# Patient Record
Sex: MALE | Race: WHITE | Employment: PART TIME | URBAN - METROPOLITAN AREA
[De-identification: names, ages, dates, MRNs, and addresses within clinical notes are randomized per-mention and may not be internally consistent; named-entity substitution may affect disease eponyms.]

---

## 2022-07-27 ENCOUNTER — OFFICE VISIT (OUTPATIENT)
Dept: ORTHOPEDIC SURGERY | Age: 27
End: 2022-07-27

## 2022-07-27 VITALS — WEIGHT: 135 LBS | HEIGHT: 73 IN | BODY MASS INDEX: 17.89 KG/M2

## 2022-07-27 DIAGNOSIS — M79.601 RIGHT ARM PAIN: Primary | ICD-10-CM

## 2022-07-27 RX ORDER — NAPROXEN 500 MG/1
500 TABLET ORAL 2 TIMES DAILY WITH MEALS
Qty: 60 TABLET | Refills: 2 | Status: SHIPPED | OUTPATIENT
Start: 2022-07-27

## 2022-07-27 NOTE — PROGRESS NOTES
Jona Lentz (: 1995) is a 23 y.o. male patient, here for evaluation of the following chief complaint(s):  Arm Pain (Right upper arm pain for 1 month. No known injury)       ASSESSMENT/PLAN:  Below is the assessment and plan developed based on review of pertinent history, physical exam, labs, studies, and medications. Naprosyn physical therapy we will try this with his elbow contracture I do not think surgery or injections will be very helpful 30 minutes face-to-face time      1. Right arm pain  -     XR ELBOW RT AP/LAT; Future      No follow-ups on file. SUBJECTIVE/OBJECTIVE:  Jona Lentz (: 1995) is a 23 y.o. male who presents today for the following:  Chief Complaint   Patient presents with    Arm Pain     Right upper arm pain for 1 month. 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 No current facility-administered medications for this visit. Past Medical History:   Diagnosis Date    Muscular dystrophy, emery-dreifuss ([address]         Past Surgical History:   Procedure Laterality Date    HX ORTHOPAEDIC Left     tendon transfers LUE       History reviewed. No pertinent family history. Social History     Tobacco Use    Smoking status: Never    Smokeless tobacco: Never   Substance Use Topics    Alcohol use: Yes     Alcohol/week: 7.0 standard drinks     Types: 7 Cans of beer per week        Review of Systems     No flowsheet data found. Vitals:  Ht 6' 1\" (1.854 m)   Wt 135 lb (61.2 kg)   BMI 17.81 kg/m²    Body mass index is 17.81 kg/m².     Physical Exam    Thin pleasant young man good historian well-groomed median radial ulnar nerve are intact on the right and the left he has limited supination pronation on the left he has full supination pronation on the right he lacks 30 degrees of full extension on the left right elbows locked at 90 biceps tight rigid      An electronic signature was used to authenticate this note.   -- Miller So MD